# Patient Record
Sex: FEMALE | Race: WHITE
[De-identification: names, ages, dates, MRNs, and addresses within clinical notes are randomized per-mention and may not be internally consistent; named-entity substitution may affect disease eponyms.]

---

## 2017-03-22 ENCOUNTER — HOSPITAL ENCOUNTER (OUTPATIENT)
Dept: HOSPITAL 45 - C.ULTR | Age: 47
Discharge: HOME | End: 2017-03-22
Attending: PHYSICIAN ASSISTANT
Payer: COMMERCIAL

## 2017-03-22 DIAGNOSIS — M79.604: Primary | ICD-10-CM

## 2017-03-22 NOTE — DIAGNOSTIC IMAGING REPORT
RIGHT LOWER EXTREMITY VENOUS DOPPLER



HISTORY:      M79.604 Right leg pain right leg

Right



COMPARISON STUDY:  None.



FINDINGS: There is normal compressibility, flow, and augmentation within the

right lower extremity deep venous system.



IMPRESSION:  

No DVT within the right lower extremity







Electronically signed by:  Shravan Yusuf M.D.

3/22/2017 4:39 PM



Dictated Date/Time:  3/22/2017 4:39 PM

## 2017-03-22 NOTE — DIAGNOSTIC IMAGING REPORT
RIGHT KNEE 3 VIEWS



HISTORY:      Right knee pain.



COMPARISON: None.



FINDINGS: There is no fracture or dislocation. Soft tissues are unremarkable. No

radiopaque foreign bodies. No knee effusion. Cartilage spaces are maintained for

age.



IMPRESSION:  

Unremarkable right knee.







Electronically signed by:  Shravan Yusuf M.D.

3/22/2017 4:57 PM



Dictated Date/Time:  3/22/2017 4:57 PM

## 2017-05-04 ENCOUNTER — HOSPITAL ENCOUNTER (OUTPATIENT)
Dept: HOSPITAL 45 - C.ONC | Age: 47
Discharge: HOME | End: 2017-05-04
Attending: PHYSICIAN ASSISTANT
Payer: COMMERCIAL

## 2017-05-04 VITALS
SYSTOLIC BLOOD PRESSURE: 131 MMHG | DIASTOLIC BLOOD PRESSURE: 83 MMHG | TEMPERATURE: 98.24 F | HEART RATE: 62 BPM | OXYGEN SATURATION: 98 %

## 2017-05-04 DIAGNOSIS — Z85.3: ICD-10-CM

## 2017-05-04 DIAGNOSIS — Z08: Primary | ICD-10-CM

## 2017-05-04 DIAGNOSIS — Z92.3: ICD-10-CM

## 2017-05-26 ENCOUNTER — HOSPITAL ENCOUNTER (OUTPATIENT)
Dept: HOSPITAL 45 - C.LABBC | Age: 47
Discharge: HOME | End: 2017-05-26
Attending: PHYSICIAN ASSISTANT
Payer: COMMERCIAL

## 2017-05-26 DIAGNOSIS — E06.3: Primary | ICD-10-CM

## 2017-05-26 LAB — TSH SERPL-ACNC: 1.76 UIU/ML (ref 0.3–4.5)

## 2017-07-05 ENCOUNTER — HOSPITAL ENCOUNTER (OUTPATIENT)
Dept: HOSPITAL 45 - C.MAMM | Age: 47
Discharge: HOME | End: 2017-07-05
Attending: PHYSICIAN ASSISTANT
Payer: COMMERCIAL

## 2017-07-05 DIAGNOSIS — Z85.3: ICD-10-CM

## 2017-07-05 DIAGNOSIS — Z08: Primary | ICD-10-CM

## 2017-07-05 NOTE — MAMMOGRAPHY REPORT
BILATERAL DIGITAL DIAGNOSTIC MAMMOGRAM TOMOSYNTHESIS WITH CAD: 7/5/2017

CLINICAL HISTORY: History of left breast DCIS status post lumpectomy January 2017 and radiation thera
py.  The patient reports no current complaints.  





TECHNIQUE:  Breast tomosynthesis in addition to standard 2D mammography was performed. Current study 
was also evaluated with a Computer Aided Detection (CAD) system.  Bilateral CC and MLO 2-D and tomosy
nthesis images and spot magnification left CC and ML views were obtained.



COMPARISON: Comparison is made to exams dated:  12/16/2016 mammogram, 12/16/2016 stereotactic biopsy,
 12/14/2016 breast MRI, 11/18/2016 mammogram, 11/18/2016 stereotactic biopsy, and 11/14/2016 mammogra
m - [facility].   



BREAST COMPOSITION:  The tissue of both breasts is heterogeneously dense, which may obscure small mas
ses.  



FINDINGS:  There are new expected postsurgical changes in the left upper outer quadrant from prior mary
mpectomy, including architectural distortion at the lumpectomy bed.  Spot magnification views of the 
lumpectomy bed demonstrate a single punctate calcification in the left lateral anterior breast which 
is stable dating back to at least the 2010 exam.  No suspicious masses or clusters of microcalcificat
ions are noted.  There is mild diffuse left breast skin thickening, likely a sequela of prior radiati
on therapy.  



The remainder of both breasts are stable compared to prior exams, without suspicious masses, calcific
ations, or areas of architectural distortion noted.  A biopsy marker clip is again noted within the l
eft upper outer quadrant from prior benign stereotactic biopsy.





IMPRESSION:  ACR-BI-RADS CATEGORY 3: PROBABLY BENIGN

Expected post treatment changes in the left breast status post lumpectomy and radiation therapy, with
out mammographic evidence of malignancy in either breast.  Recommend follow-up diagnostic tomosynthes
is mammograms of the left breast in 6 months to reevaluate the posttreatment changes.



The patient has been verbally notified of the results.  





Approximately 10% of breast cancers are not detected with mammography. A negative mammographic report
 should not delay biopsy if a clinically suggestive mass is present.



Gianna Akins M.D.          

/:7/5/2017 08:51:47  



Imaging Technologist: Jaylin ROMERO(R)(M), [facility]

letter sent: Personal History 3  

BI-RADS Code: ACR-BI-RADS Category 3: Probably Benign

## 2017-09-14 ENCOUNTER — HOSPITAL ENCOUNTER (OUTPATIENT)
Dept: HOSPITAL 45 - C.LABBC | Age: 47
Discharge: HOME | End: 2017-09-14
Attending: INTERNAL MEDICINE
Payer: COMMERCIAL

## 2017-09-14 DIAGNOSIS — D05.12: Primary | ICD-10-CM

## 2017-09-14 LAB
ALBUMIN/GLOB SERPL: 1.1 {RATIO} (ref 0.9–2)
ALP SERPL-CCNC: 34 U/L (ref 45–117)
ALT SERPL-CCNC: 25 U/L (ref 12–78)
ANION GAP SERPL CALC-SCNC: 4 MMOL/L (ref 3–11)
AST SERPL-CCNC: 14 U/L (ref 15–37)
BASOPHILS # BLD: 0.01 K/UL (ref 0–0.2)
BASOPHILS NFR BLD: 0.2 %
BUN SERPL-MCNC: 14 MG/DL (ref 7–18)
BUN/CREAT SERPL: 16.8 (ref 10–20)
CALCIUM SERPL-MCNC: 8.9 MG/DL (ref 8.5–10.1)
CHLORIDE SERPL-SCNC: 108 MMOL/L (ref 98–107)
CHOLEST/HDLC SERPL: 3.3 {RATIO}
CO2 SERPL-SCNC: 30 MMOL/L (ref 21–32)
COMPLETE: YES
CREAT SERPL-MCNC: 0.83 MG/DL (ref 0.6–1.2)
EOSINOPHIL NFR BLD AUTO: 280 K/UL (ref 130–400)
GLOBULIN SER-MCNC: 3.2 GM/DL (ref 2.5–4)
GLUCOSE SERPL-MCNC: 98 MG/DL (ref 70–99)
GLUCOSE UR QL: 43 MG/DL
HCT VFR BLD CALC: 40.9 % (ref 37–47)
IG%: 0.2 %
IMM GRANULOCYTES NFR BLD AUTO: 16.3 %
KETONES UR QL STRIP: 82 MG/DL
LYMPHOCYTES # BLD: 0.92 K/UL (ref 1.2–3.4)
MCH RBC QN AUTO: 28.4 PG (ref 25–34)
MCHC RBC AUTO-ENTMCNC: 31.3 G/DL (ref 32–36)
MCV RBC AUTO: 90.9 FL (ref 80–100)
MONOCYTES NFR BLD: 9.2 %
NEUTROPHILS # BLD AUTO: 1.4 %
NEUTROPHILS NFR BLD AUTO: 72.7 %
NITRITE UR QL STRIP: 95 MG/DL (ref 0–150)
PH UR: 144 MG/DL (ref 0–200)
PMV BLD AUTO: 10 FL (ref 7.4–10.4)
POTASSIUM SERPL-SCNC: 4.3 MMOL/L (ref 3.5–5.1)
RBC # BLD AUTO: 4.5 M/UL (ref 4.2–5.4)
SODIUM SERPL-SCNC: 142 MMOL/L (ref 136–145)
TSH SERPL-ACNC: 2.71 UIU/ML (ref 0.3–4.5)
VERY LOW DENSITY LIPOPROT CALC: 19 MG/DL
WBC # BLD AUTO: 5.64 K/UL (ref 4.8–10.8)

## 2017-11-16 ENCOUNTER — HOSPITAL ENCOUNTER (OUTPATIENT)
Dept: HOSPITAL 45 - C.ONC | Age: 47
Discharge: HOME | End: 2017-11-16
Attending: PHYSICIAN ASSISTANT
Payer: COMMERCIAL

## 2017-11-16 VITALS
OXYGEN SATURATION: 100 % | DIASTOLIC BLOOD PRESSURE: 94 MMHG | HEART RATE: 79 BPM | SYSTOLIC BLOOD PRESSURE: 134 MMHG | TEMPERATURE: 98.42 F

## 2017-11-16 DIAGNOSIS — Z08: Primary | ICD-10-CM

## 2017-11-16 DIAGNOSIS — Z85.3: ICD-10-CM

## 2017-11-16 DIAGNOSIS — Z92.3: ICD-10-CM

## 2018-01-05 ENCOUNTER — HOSPITAL ENCOUNTER (OUTPATIENT)
Dept: HOSPITAL 45 - C.MAMM | Age: 48
Discharge: HOME | End: 2018-01-05
Attending: PHYSICIAN ASSISTANT
Payer: COMMERCIAL

## 2018-01-05 DIAGNOSIS — Z92.3: ICD-10-CM

## 2018-01-05 DIAGNOSIS — Z98.890: ICD-10-CM

## 2018-01-05 DIAGNOSIS — Z08: Primary | ICD-10-CM

## 2018-01-05 DIAGNOSIS — Z85.3: ICD-10-CM

## 2018-01-05 NOTE — MAMMOGRAPHY REPORT
UNILATERAL LEFT DIGITAL DIAGNOSTIC MAMMOGRAM TOMOSYNTHESIS WITH CAD: 1/5/2018

CLINICAL HISTORY: History of left breast cancer status post lumpectomy January 2017 and radiation the
rapy.  The patient reports no current complaints.  





TECHNIQUE:  Breast tomosynthesis in addition to standard 2D mammography was performed. Current study 
was also evaluated with a Computer Aided Detection (CAD) system.  Left CC and MLO 2-D and tomosynthes
is images and spot magnification left CC and ML views were obtained.



COMPARISON: Comparison is made to exams dated:  7/5/2017 mammogram, 12/16/2016 mammogram, 12/16/2016 
stereotactic biopsy, 12/14/2016 breast MRI, 11/18/2016 mammogram, and 11/18/2016 stereotactic biopsy 
- [facility].   



BREAST COMPOSITION:  The tissue of the left breast is heterogeneously dense, which may obscure small 
masses.  



FINDINGS:  There are no suspicious masses, calcifications, or areas of nonsurgical architectural dist
ortion within the left breast.  There has been no significant interval change.  There are stable post
surgical changes in the left upper outer quadrant from prior lumpectomy, including architectural dist
ortion at the lumpectomy bed.  A linear scar marker denotes a scar on the left upper outer breast.  S
pot magnification views of the lumpectomy bed demonstrate a single punctate calcification in the left
 lateral anterior breast which is stable dating back to at least the 2010 exam.  There is stable mild
 diffuse left breast skin thickening, likely a sequela of prior radiation therapy.  Round obscured 9 
mm mass in the left upper outer quadrant seen on the tomosynthesis images is decreased in size compar
ed to the 2014 exam and is consistent with a benign cyst.





IMPRESSION:  ACR-BI-RADS CATEGORY 3: PROBABLY BENIGN

Stable posttreatment changes in the left breast, without mammographic evidence of malignancy in the l
eft breast.  Recommend bilateral diagnostic tomosynthesis mammograms in 6 months, to reevaluate left 
breast posttreatment changes and for routine mammography of the right breast.  



The patient has been verbally notified of the results.  





Approximately 10% of breast cancers are not detected with mammography. A negative mammographic report
 should not delay biopsy if a clinically suggestive mass is present.



Gianna Akins M.D.          

/:1/5/2018 08:14:51  



Imaging Technologist: Flor Pratt, [facility]

letter sent: Personal History 3  

BI-RADS Code: ACR-BI-RADS Category 3: Probably Benign

## 2018-03-16 ENCOUNTER — HOSPITAL ENCOUNTER (OUTPATIENT)
Dept: HOSPITAL 45 - C.PAPS | Age: 48
Discharge: HOME | End: 2018-03-16
Attending: OBSTETRICS & GYNECOLOGY
Payer: COMMERCIAL

## 2018-03-16 DIAGNOSIS — Z01.419: Primary | ICD-10-CM

## 2018-03-16 DIAGNOSIS — Z80.3: ICD-10-CM

## 2018-03-16 DIAGNOSIS — D05.10: ICD-10-CM

## 2018-03-16 DIAGNOSIS — Z79.810: ICD-10-CM
